# Patient Record
Sex: FEMALE | Race: WHITE | NOT HISPANIC OR LATINO | URBAN - METROPOLITAN AREA
[De-identification: names, ages, dates, MRNs, and addresses within clinical notes are randomized per-mention and may not be internally consistent; named-entity substitution may affect disease eponyms.]

---

## 2018-11-03 ENCOUNTER — OFFICE VISIT - HEALTHEAST (OUTPATIENT)
Dept: FAMILY MEDICINE | Facility: CLINIC | Age: 33
End: 2018-11-03

## 2018-11-03 DIAGNOSIS — R07.89 CHEST WALL PAIN: ICD-10-CM

## 2018-11-03 DIAGNOSIS — T14.8XXA PULLED MUSCLE: ICD-10-CM

## 2021-06-02 VITALS — WEIGHT: 206 LBS | BODY MASS INDEX: 36.49 KG/M2

## 2021-06-21 NOTE — PROGRESS NOTES
Provider wore a mask during this visit.   Subjective:   Iraida Quiroz is a 33 y.o. female and is new to [facility].  Roomed by: lorene    Accompanied by Daughter    Refills needed? No    Do you have any forms that need to be filled out? No      Chief Complaint   Patient presents with     Muscle Pain     last night 18 when getting out of bath, chest hurts heard crack, hurts to touch, cough   Says that as she was lifting her body out of the tub, she heard a crack in her chest and since then has had left sided chest wall pain. Says she has never had this before. Says she cannot do any heavy lifting, like lifting her 2 -1/2 year old daughter. Says moving sideways makes the pain worse. Tried some tylenol, but had no improvement. Denies any shortness of breath. Denies any recent fevers, chills, headaches. Admits that she has been eating, drinking and urinating normally.  Denies any recent belly pain, vomiting or diarrhea. Says on 10/28 she was seen at the [facility] and was told she had an URI. Says continued to cough up phlegm.     Past Medical History:   Diagnosis Date     H/O varicella     childhood     H/O: 1 miscarriage      Past Surgical History:   Procedure Laterality Date     TONSILECTOMY, ADENOIDECTOMY, BILATERAL MYRINGOTOMY AND TUBES      age 4     Family History   Problem Relation Age of Onset     Alcohol abuse Mother      treatment and recovered     Early death Father 49     liver failure; alcoholism     Alcohol abuse Father      Lupus Paternal Aunt      Kidney disease Other      Hypertension Other      Diabetes Other      Cancer Paternal Uncle      unknown type.   in late 40s     Learning disabilities Brother      Kidney failure Cousin      childhood. unknown etiology     Patient Active Problem List   Diagnosis     Supervision of other normal pregnancy     Rh negative, antepartum     Headache     Obesity     Vitamin D deficiency     Anxiety in pregnancy, antepartum, second trimester  "    Stress at work     Rectal bleeding     Anemia affecting pregnancy in third trimester     Pregnant     Social History     Social History     Marital status:      Spouse name: N/A     Number of children: N/A     Years of education: N/A     Occupational History     Not on file.     Social History Main Topics     Smoking status: Never Smoker     Smokeless tobacco: Never Used     Alcohol use No     Drug use: No     Sexual activity: Yes     Partners: Male     Other Topics Concern     Not on file     Social History Narrative     Review of Systems  See HPI for ROS, otherwise all other systems negative  No Known Allergies    Current Outpatient Prescriptions:      acetone, urine, test (ACETONE, URINE, TEST) Strp, Use 1 strip daily, Disp: 1 strip, Rfl: 3     blood sugar diagnostic Strp, Use 4 strips daily as directed., Disp: 120 strip, Rfl: 3     cholecalciferol, vitamin D3, (VITAMIN D3) 4,000 unit cap, Take 1 capsule by mouth daily., Disp: , Rfl:      insulin NPH human recomb (HUMULIN N PEN) 100 unit/mL (3 mL) pen, Inject 10 Units under the skin bedtime., Disp: , Rfl:      lancets (ONETOUCH DELICA LANCETS) 30 gauge Misc, Use up to 4 lancets daily as directed., Disp: 120 each, Rfl: 3     pen needle, diabetic (BD ULTRA-FINE LISS PEN NEEDLES) 32 gauge x 5/32\" Ndle, Use 1 each As Directed once daily. AT BEDTIME, Disp: 100 each, Rfl: 1     PRENATAL VIT #91/FE FUM/FA/DHA (PRENATAL + DHA ORAL), Take 1 capsule by mouth., Disp: , Rfl:     Objective:     Vitals:    11/03/18 1035   BP: 122/74   Pulse: 86   Resp: 16   Temp: 98.4  F (36.9  C)   TempSrc: Oral   SpO2: 98%   Weight: 206 lb (93.4 kg)   Gen - Pt in NAD  Eyes - Conjunctiva non injected, no drainage  Face - non TTP over frontal sinus areas; non TTP over maxillary areas  Ears - external canals - no induration, Right TM - not injected, Left TM - not injected   Nose - not congested, no nasal drainage  Pharynx - not well visualized   Neck - supple, no cervical adenopathy, " no masses  Cor - RRR w/o murmur  Chest wall - mildly TTP on left side  Lungs - Good air entry; no wheezes or crackles noted on auscultation - no coughing noted  Skin - no lesions, no rashes noted     Assessment - Plan   Medical Decision Making -patient presents with having felt something crack when she was pulling herself or pushing herself out of the bathtub last night.  She has had had a recent URI symptoms a week ago.  On exam she is afebrile and vital signs are stable.  She does have some mild tenderness to palpation on the left side of her chest wall,  but balance is unremarkable.  Patient has no risk factors for PE.  Her symptoms are consistent with a pulled muscle.    1. Pulled muscle  - methocarbamol (ROBAXIN) 750 MG tablet; Take 1-2 tablets (750-1,500 mg total) by mouth every 6 (six) hours as needed (pain or spasm).  Dispense: 40 tablet; Refill: 0    2. Chest wall pain    At the conclusion of the encounter, assessment and plan were discussed. All questions were answered. The patient or guardian acknowledged understanding and was involved in the decision making regarding the overall care plan.    Patient Instructions   1. If symptoms not improving over next several days, follow up with one of the [facility] primary providers   2. Avoid re-injury or any activity which aggravates the pain  3. May alternate ibuprofen 600 mg every 6 hours with tylenol 1000 mg every 6 hours as needed for pain - Max of 3 doses of each in 24 hours  4. Call clinic number with any questions - answered 24/7

## 2021-08-22 ENCOUNTER — HEALTH MAINTENANCE LETTER (OUTPATIENT)
Age: 36
End: 2021-08-22

## 2021-10-16 ENCOUNTER — HEALTH MAINTENANCE LETTER (OUTPATIENT)
Age: 36
End: 2021-10-16

## 2022-10-01 ENCOUNTER — HEALTH MAINTENANCE LETTER (OUTPATIENT)
Age: 37
End: 2022-10-01

## 2023-10-15 ENCOUNTER — HEALTH MAINTENANCE LETTER (OUTPATIENT)
Age: 38
End: 2023-10-15